# Patient Record
Sex: FEMALE | Race: OTHER | HISPANIC OR LATINO | URBAN - METROPOLITAN AREA
[De-identification: names, ages, dates, MRNs, and addresses within clinical notes are randomized per-mention and may not be internally consistent; named-entity substitution may affect disease eponyms.]

---

## 2020-07-31 ENCOUNTER — EMERGENCY (EMERGENCY)
Facility: HOSPITAL | Age: 40
LOS: 1 days | Discharge: HOME | End: 2020-07-31
Attending: EMERGENCY MEDICINE
Payer: COMMERCIAL

## 2020-07-31 VITALS
HEART RATE: 77 BPM | OXYGEN SATURATION: 99 % | TEMPERATURE: 98 F | DIASTOLIC BLOOD PRESSURE: 76 MMHG | WEIGHT: 192.9 LBS | HEIGHT: 66 IN | SYSTOLIC BLOOD PRESSURE: 125 MMHG | RESPIRATION RATE: 18 BRPM

## 2020-07-31 DIAGNOSIS — Z98.51 TUBAL LIGATION STATUS: Chronic | ICD-10-CM

## 2020-07-31 DIAGNOSIS — Z98.89 OTHER SPECIFIED POSTPROCEDURAL STATES: Chronic | ICD-10-CM

## 2020-07-31 LAB
ALBUMIN SERPL ELPH-MCNC: 4.3 G/DL — SIGNIFICANT CHANGE UP (ref 3.3–5.2)
ALP SERPL-CCNC: 62 U/L — SIGNIFICANT CHANGE UP (ref 40–120)
ALT FLD-CCNC: 12 U/L — SIGNIFICANT CHANGE UP
ANION GAP SERPL CALC-SCNC: 10 MMOL/L — SIGNIFICANT CHANGE UP (ref 5–17)
APPEARANCE UR: CLEAR — SIGNIFICANT CHANGE UP
AST SERPL-CCNC: 15 U/L — SIGNIFICANT CHANGE UP
BASOPHILS # BLD AUTO: 0.03 K/UL — SIGNIFICANT CHANGE UP (ref 0–0.2)
BASOPHILS NFR BLD AUTO: 0.3 % — SIGNIFICANT CHANGE UP (ref 0–2)
BILIRUB SERPL-MCNC: <0.2 MG/DL — LOW (ref 0.4–2)
BILIRUB UR-MCNC: NEGATIVE — SIGNIFICANT CHANGE UP
BLD GP AB SCN SERPL QL: SIGNIFICANT CHANGE UP
BUN SERPL-MCNC: 8 MG/DL — SIGNIFICANT CHANGE UP (ref 8–20)
CALCIUM SERPL-MCNC: 9.3 MG/DL — SIGNIFICANT CHANGE UP (ref 8.6–10.2)
CHLORIDE SERPL-SCNC: 103 MMOL/L — SIGNIFICANT CHANGE UP (ref 98–107)
CO2 SERPL-SCNC: 24 MMOL/L — SIGNIFICANT CHANGE UP (ref 22–29)
COLOR SPEC: YELLOW — SIGNIFICANT CHANGE UP
CREAT SERPL-MCNC: 0.65 MG/DL — SIGNIFICANT CHANGE UP (ref 0.5–1.3)
DIFF PNL FLD: NEGATIVE — SIGNIFICANT CHANGE UP
EOSINOPHIL # BLD AUTO: 0.14 K/UL — SIGNIFICANT CHANGE UP (ref 0–0.5)
EOSINOPHIL NFR BLD AUTO: 1.3 % — SIGNIFICANT CHANGE UP (ref 0–6)
EPI CELLS # UR: SIGNIFICANT CHANGE UP
GLUCOSE SERPL-MCNC: 142 MG/DL — HIGH (ref 70–99)
GLUCOSE UR QL: NEGATIVE MG/DL — SIGNIFICANT CHANGE UP
HCG SERPL-ACNC: 131.7 MIU/ML — HIGH
HCT VFR BLD CALC: 38.9 % — SIGNIFICANT CHANGE UP (ref 34.5–45)
HGB BLD-MCNC: 12.4 G/DL — SIGNIFICANT CHANGE UP (ref 11.5–15.5)
IMM GRANULOCYTES NFR BLD AUTO: 0.4 % — SIGNIFICANT CHANGE UP (ref 0–1.5)
KETONES UR-MCNC: NEGATIVE — SIGNIFICANT CHANGE UP
LEUKOCYTE ESTERASE UR-ACNC: ABNORMAL
LYMPHOCYTES # BLD AUTO: 2.9 K/UL — SIGNIFICANT CHANGE UP (ref 1–3.3)
LYMPHOCYTES # BLD AUTO: 27.7 % — SIGNIFICANT CHANGE UP (ref 13–44)
MCHC RBC-ENTMCNC: 29.7 PG — SIGNIFICANT CHANGE UP (ref 27–34)
MCHC RBC-ENTMCNC: 31.9 GM/DL — LOW (ref 32–36)
MCV RBC AUTO: 93.3 FL — SIGNIFICANT CHANGE UP (ref 80–100)
MONOCYTES # BLD AUTO: 0.54 K/UL — SIGNIFICANT CHANGE UP (ref 0–0.9)
MONOCYTES NFR BLD AUTO: 5.2 % — SIGNIFICANT CHANGE UP (ref 2–14)
NEUTROPHILS # BLD AUTO: 6.81 K/UL — SIGNIFICANT CHANGE UP (ref 1.8–7.4)
NEUTROPHILS NFR BLD AUTO: 65.1 % — SIGNIFICANT CHANGE UP (ref 43–77)
NITRITE UR-MCNC: NEGATIVE — SIGNIFICANT CHANGE UP
PH UR: 6.5 — SIGNIFICANT CHANGE UP (ref 5–8)
PLATELET # BLD AUTO: 303 K/UL — SIGNIFICANT CHANGE UP (ref 150–400)
POTASSIUM SERPL-MCNC: 4.3 MMOL/L — SIGNIFICANT CHANGE UP (ref 3.5–5.3)
POTASSIUM SERPL-SCNC: 4.3 MMOL/L — SIGNIFICANT CHANGE UP (ref 3.5–5.3)
PROT SERPL-MCNC: 7.5 G/DL — SIGNIFICANT CHANGE UP (ref 6.6–8.7)
PROT UR-MCNC: NEGATIVE MG/DL — SIGNIFICANT CHANGE UP
RBC # BLD: 4.17 M/UL — SIGNIFICANT CHANGE UP (ref 3.8–5.2)
RBC # FLD: 12.6 % — SIGNIFICANT CHANGE UP (ref 10.3–14.5)
RBC CASTS # UR COMP ASSIST: NEGATIVE /HPF — SIGNIFICANT CHANGE UP (ref 0–4)
SODIUM SERPL-SCNC: 137 MMOL/L — SIGNIFICANT CHANGE UP (ref 135–145)
SP GR SPEC: 1.01 — SIGNIFICANT CHANGE UP (ref 1.01–1.02)
UROBILINOGEN FLD QL: NEGATIVE MG/DL — SIGNIFICANT CHANGE UP
WBC # BLD: 10.46 K/UL — SIGNIFICANT CHANGE UP (ref 3.8–10.5)
WBC # FLD AUTO: 10.46 K/UL — SIGNIFICANT CHANGE UP (ref 3.8–10.5)
WBC UR QL: SIGNIFICANT CHANGE UP

## 2020-07-31 PROCEDURE — 81001 URINALYSIS AUTO W/SCOPE: CPT

## 2020-07-31 PROCEDURE — 76801 OB US < 14 WKS SINGLE FETUS: CPT | Mod: 26,59

## 2020-07-31 PROCEDURE — 86900 BLOOD TYPING SEROLOGIC ABO: CPT

## 2020-07-31 PROCEDURE — 84702 CHORIONIC GONADOTROPIN TEST: CPT

## 2020-07-31 PROCEDURE — 86901 BLOOD TYPING SEROLOGIC RH(D): CPT

## 2020-07-31 PROCEDURE — 99285 EMERGENCY DEPT VISIT HI MDM: CPT

## 2020-07-31 PROCEDURE — 76817 TRANSVAGINAL US OBSTETRIC: CPT | Mod: 26

## 2020-07-31 PROCEDURE — 86850 RBC ANTIBODY SCREEN: CPT

## 2020-07-31 PROCEDURE — 36415 COLL VENOUS BLD VENIPUNCTURE: CPT

## 2020-07-31 PROCEDURE — 87086 URINE CULTURE/COLONY COUNT: CPT

## 2020-07-31 PROCEDURE — 80053 COMPREHEN METABOLIC PANEL: CPT

## 2020-07-31 PROCEDURE — 76817 TRANSVAGINAL US OBSTETRIC: CPT

## 2020-07-31 PROCEDURE — 85027 COMPLETE CBC AUTOMATED: CPT

## 2020-07-31 PROCEDURE — 99284 EMERGENCY DEPT VISIT MOD MDM: CPT | Mod: 25

## 2020-07-31 PROCEDURE — 76801 OB US < 14 WKS SINGLE FETUS: CPT

## 2020-07-31 NOTE — ED PROVIDER NOTE - FAMILY HISTORY
Mother  Still living? Yes, Estimated age: 61-70  Family history of diabetes mellitus, Age at diagnosis: 51-60     Grandparent  Still living? No  Family history of ovarian cancer, Age at diagnosis: 61-70

## 2020-07-31 NOTE — ED PROVIDER NOTE - PROGRESS NOTE DETAILS
JOSYO- 40 y/o female with hx of tubal ligation presents to the ED c/o adnexal pain for 3 days. + hcg at urgent care, Follows Dr. Zarate, r/o ectopic, [facility]- no intrauterine pregnancy on u/s, with complex free fluid in pelvis, OB consulted [facility]- pt seen and evaluated by GYN team, pt was offerred treatment for likely ectopic pregnancy, but pt is hopefully that it is a intrauterine pregnancy at this time, advised pt the risks of possibility of ectopic pregnancy, and worsening symtpoms to return directly to the ED, pt states she will come back and understands risks that pt may have an ectopic pregnancy, pt needs to follow up in 2 days for hcg blood work to determine if doubling, will attempt to give a script for blood work, give [facility] clinic or advised to return tot he ED for check. pt understands, Pt reassessed, pt feeling better at this time, vss, pt able to walk, talk and vocalized plan of action. Discussed in depth and explained to pt in depth the next steps that need to be taking including proper follow up with PCP or specialists. All incidental findings were discussed with pt as well. Pt verbalized their concerns and all questions were answered. Pt understands dispo and wants discharge. Given good instructions when to return to ED and importance of f/u.

## 2020-07-31 NOTE — CONSULT NOTE ADULT - ASSESSMENT
A 38 yo  with positive beta HCG after tubal ligation to rule out ectopic pregnancy  Ectopic pregnancy is likely however not confirmed by US and clinical picture today  -Script for HCG level on monday in [facility]   -F/U with Dr. Freddie Zarate on Monday for US and HCG read  -ectopic precautions given to return to the ED    Pt fully understanding the severity of the potential ruptured ectopic pregnancy and agrees with the plan   Discussed with Dr. Zarate and Dr. Zhong

## 2020-07-31 NOTE — ED PROVIDER NOTE - PATIENT PORTAL LINK FT
You can access the FollowMyHealth Patient Portal offered by [facility] by registering at the following website: [email or web address]. By joining [facility]’s FollowMyHealth portal, you will also be able to view your health information using other applications (apps) compatible with our system.

## 2020-07-31 NOTE — ED PROVIDER NOTE - NSFOLLOWUPINSTRUCTIONS_ED_ALL_ED_FT
1) Please follow-up with your OB in 3 days.  Please call tomorrow for an appointment.  If you cannot follow-up with your primary care doctor please return to the ED for any urgent issues.  2) You were given a copy of the tests performed today.  Please bring the results with you and review them with your primary care doctor.  3) If you have any worsening of symptoms or any other concerns please return to the ED immediately.  4) Please continue taking your home medications as directed.   5) go to lab for blood work in 2 days, [facility] clinic or return to the ED for HCG check

## 2020-07-31 NOTE — CONSULT NOTE ADULT - SUBJECTIVE AND OBJECTIVE BOX
JEAN CARLOS HOOK  A 39y  with Last Menstrual Period  presented to the ED with positive urine pregnancy test and crampy RUQ pain  Pt underwent tubal ligation 4 and a half years ago. Currently wants to keep the pregnancy if it is viable    PAST MEDICAL & SURGICAL HISTORY:  Asthma  History of bilateral tubal ligation  S/P  section x3      Allergies    latex (Rash)  No Known Drug Allergies    FAMILY HISTORY:  Family history of ovarian cancer (Grandparent)  Family history of diabetes mellitus (Mother)    Social History: Denies ETOH, smoking and drugs.   POB/GYN Hx:    Vital Signs:  Vital Signs Last 24 Hrs  T(C): 36.8 (2020 18:08), Max: 36.8 (2020 18:08)  T(F): 98.2 (2020 18:08), Max: 98.2 (2020 18:08)  HR: 77 (2020 18:08) (77 - 77)  BP: 125/76 (2020 18:08) (125/76 - 125/76)  RR: 18 (2020 18:08) (18 - 18)  SpO2: 99% (2020 18:08) (99% - 99%)    Physical Exam:  General: NAD  CVS: RRR, +S1/S2  Lungs: CTA  Abdomen: +BS, soft, NT.  Ext: No cyanosis, edema or calf tenderness.     Labs:                          12.4   10.46 )-----------( 303      ( 2020 18:55 )             38.9       137  |  103  |  8.0  ----------------------------<  142<H>  4.3   |  24.0  |  0.65    Ca    9.3      2020 18:55    TPro  7.5  /  Alb  4.3  /  TBili  <0.2<L>  /  DBili  x   /  AST  15  /  ALT  12  /  AlkPhos  62        HCG Quantitative, Serum: 131.7 mIU/mL (20 @ 18:55)      Radiology:  < from: US OB <=14 Weeks, First Gestation (20 @ 19:53) >   EXAM:  US OB TRANSVAGINAL                         EXAM:  US OB LES THAN 14 WKS 1ST GEST                          PROCEDURE DATE:  2020          INTERPRETATION:  CLINICAL INFORMATION: RIGHT adnexal pain, history of tubal ligation, positive hCG    LMP: 2020      COMPARISON: None available.    Transabdominal and endovaginal pelvic sonography was performed with Doppler    FINDINGS:  Uterus: 10.1 x 7 x 6 cm. Multiple the Bovie and cysts are noted the largest measuring 7 mm.    Endometrial canal: 14.6 mm. No IUP is visualized in endometrial canal.    Right ovary: 6.1 x 4.3 x 5.6 cm. 4.6 x 3.7 x 4.9 cm complex cyst containing hemorrhagic or proteinaceous debris with septa. Normal vascularity.      Left ovary: 4.1 x 2.9 x 3.7 cm. Within normal limits. Normal vascularity.    Fluid: Mild complex free fluid is noted..    IMPRESSION:    No evidence of intrauterine gestation.  4.6 x 3.7 x 4.9 cm complex cyst containing hemorrhagic or proteinaceous debris with septa. No evidence of torsion. Mild complex free fluid in pelvis. Serial beta hCG levels and ultrasound recommended for further evaluation to establish early pregnancy or rule out ectopic.              GIOVANNI JACOB M.D., ATTENDING RADIOLOGIST  This document has been electronically signed. 2020  8:34PM        < end of copied text >

## 2020-08-02 ENCOUNTER — EMERGENCY (EMERGENCY)
Facility: HOSPITAL | Age: 40
LOS: 1 days | Discharge: HOME | End: 2020-08-02
Attending: EMERGENCY MEDICINE
Payer: COMMERCIAL

## 2020-08-02 VITALS
OXYGEN SATURATION: 99 % | HEIGHT: 66 IN | WEIGHT: 192.9 LBS | HEART RATE: 77 BPM | SYSTOLIC BLOOD PRESSURE: 101 MMHG | TEMPERATURE: 98 F | RESPIRATION RATE: 16 BRPM | DIASTOLIC BLOOD PRESSURE: 69 MMHG

## 2020-08-02 DIAGNOSIS — Z98.51 TUBAL LIGATION STATUS: Chronic | ICD-10-CM

## 2020-08-02 DIAGNOSIS — Z98.89 OTHER SPECIFIED POSTPROCEDURAL STATES: Chronic | ICD-10-CM

## 2020-08-02 PROBLEM — J45.909 UNSPECIFIED ASTHMA, UNCOMPLICATED: Chronic | Status: ACTIVE | Noted: 2020-07-31

## 2020-08-02 LAB
ALBUMIN SERPL ELPH-MCNC: 4.7 G/DL — SIGNIFICANT CHANGE UP (ref 3.3–5.2)
ALP SERPL-CCNC: 68 U/L — SIGNIFICANT CHANGE UP (ref 40–120)
ALT FLD-CCNC: 11 U/L — SIGNIFICANT CHANGE UP
ANION GAP SERPL CALC-SCNC: 11 MMOL/L — SIGNIFICANT CHANGE UP (ref 5–17)
AST SERPL-CCNC: 13 U/L — SIGNIFICANT CHANGE UP
BASOPHILS # BLD AUTO: 0.03 K/UL — SIGNIFICANT CHANGE UP (ref 0–0.2)
BASOPHILS NFR BLD AUTO: 0.3 % — SIGNIFICANT CHANGE UP (ref 0–2)
BILIRUB SERPL-MCNC: 0.6 MG/DL — SIGNIFICANT CHANGE UP (ref 0.4–2)
BLD GP AB SCN SERPL QL: SIGNIFICANT CHANGE UP
BUN SERPL-MCNC: 9 MG/DL — SIGNIFICANT CHANGE UP (ref 8–20)
CALCIUM SERPL-MCNC: 9.1 MG/DL — SIGNIFICANT CHANGE UP (ref 8.6–10.2)
CHLORIDE SERPL-SCNC: 99 MMOL/L — SIGNIFICANT CHANGE UP (ref 98–107)
CO2 SERPL-SCNC: 24 MMOL/L — SIGNIFICANT CHANGE UP (ref 22–29)
CREAT SERPL-MCNC: 0.59 MG/DL — SIGNIFICANT CHANGE UP (ref 0.5–1.3)
CULTURE RESULTS: SIGNIFICANT CHANGE UP
EOSINOPHIL # BLD AUTO: 0.17 K/UL — SIGNIFICANT CHANGE UP (ref 0–0.5)
EOSINOPHIL NFR BLD AUTO: 1.8 % — SIGNIFICANT CHANGE UP (ref 0–6)
GLUCOSE SERPL-MCNC: 108 MG/DL — HIGH (ref 70–99)
HCG SERPL-ACNC: 177.6 MIU/ML — HIGH
HCT VFR BLD CALC: 40.9 % — SIGNIFICANT CHANGE UP (ref 34.5–45)
HGB BLD-MCNC: 13.4 G/DL — SIGNIFICANT CHANGE UP (ref 11.5–15.5)
IMM GRANULOCYTES NFR BLD AUTO: 0.1 % — SIGNIFICANT CHANGE UP (ref 0–1.5)
LYMPHOCYTES # BLD AUTO: 2.21 K/UL — SIGNIFICANT CHANGE UP (ref 1–3.3)
LYMPHOCYTES # BLD AUTO: 23.3 % — SIGNIFICANT CHANGE UP (ref 13–44)
MCHC RBC-ENTMCNC: 30.2 PG — SIGNIFICANT CHANGE UP (ref 27–34)
MCHC RBC-ENTMCNC: 32.8 GM/DL — SIGNIFICANT CHANGE UP (ref 32–36)
MCV RBC AUTO: 92.3 FL — SIGNIFICANT CHANGE UP (ref 80–100)
MONOCYTES # BLD AUTO: 0.58 K/UL — SIGNIFICANT CHANGE UP (ref 0–0.9)
MONOCYTES NFR BLD AUTO: 6.1 % — SIGNIFICANT CHANGE UP (ref 2–14)
NEUTROPHILS # BLD AUTO: 6.5 K/UL — SIGNIFICANT CHANGE UP (ref 1.8–7.4)
NEUTROPHILS NFR BLD AUTO: 68.4 % — SIGNIFICANT CHANGE UP (ref 43–77)
PLATELET # BLD AUTO: 290 K/UL — SIGNIFICANT CHANGE UP (ref 150–400)
POTASSIUM SERPL-MCNC: 4 MMOL/L — SIGNIFICANT CHANGE UP (ref 3.5–5.3)
POTASSIUM SERPL-SCNC: 4 MMOL/L — SIGNIFICANT CHANGE UP (ref 3.5–5.3)
PROT SERPL-MCNC: 7.7 G/DL — SIGNIFICANT CHANGE UP (ref 6.6–8.7)
RBC # BLD: 4.43 M/UL — SIGNIFICANT CHANGE UP (ref 3.8–5.2)
RBC # FLD: 12.7 % — SIGNIFICANT CHANGE UP (ref 10.3–14.5)
SODIUM SERPL-SCNC: 134 MMOL/L — LOW (ref 135–145)
SPECIMEN SOURCE: SIGNIFICANT CHANGE UP
WBC # BLD: 9.5 K/UL — SIGNIFICANT CHANGE UP (ref 3.8–10.5)
WBC # FLD AUTO: 9.5 K/UL — SIGNIFICANT CHANGE UP (ref 3.8–10.5)

## 2020-08-02 PROCEDURE — 99284 EMERGENCY DEPT VISIT MOD MDM: CPT

## 2020-08-02 PROCEDURE — 85027 COMPLETE CBC AUTOMATED: CPT

## 2020-08-02 PROCEDURE — 80053 COMPREHEN METABOLIC PANEL: CPT

## 2020-08-02 PROCEDURE — 86850 RBC ANTIBODY SCREEN: CPT

## 2020-08-02 PROCEDURE — 36415 COLL VENOUS BLD VENIPUNCTURE: CPT

## 2020-08-02 PROCEDURE — 86900 BLOOD TYPING SEROLOGIC ABO: CPT

## 2020-08-02 PROCEDURE — 84702 CHORIONIC GONADOTROPIN TEST: CPT

## 2020-08-02 PROCEDURE — 86901 BLOOD TYPING SEROLOGIC RH(D): CPT

## 2020-08-02 NOTE — ED STATDOCS - ATTENDING CONTRIBUTION TO CARE
I, Patrick Earl, performed the initial face to face bedside interview with this patient regarding history of present illness, review of symptoms and relevant past medical, social and family history.  I completed an independent physical examination.  I was the initial provider who evaluated this patient. I have signed out the follow up of any pending tests (i.e. labs, radiological studies) to the ACP.  I have communicated the patient’s plan of care and disposition with the ACP.

## 2020-08-02 NOTE — ED ADULT TRIAGE NOTE - CHIEF COMPLAINT QUOTE
Pt here for repeat beta. Was last seen here on Friday. Pt denies any vaginal bleeding, has mild cramping. Approx 4 weeks pregnant.

## 2020-08-02 NOTE — ED STATDOCS - CARE PROVIDER_API CALL
Freddie Rea  OBSTETRICS AND GYNECOLOGY  [address] [place] [address]  Phone: [phone]  Fax: [phone]  Follow Up Time:

## 2020-08-02 NOTE — ED STATDOCS - TOBACCO USE
Bedside and Verbal shift change report given to [address] (oncoming nurse) by Mekhi Caro RN (offgoing nurse). Report included the following information SBAR, Kardex, Intake/Output, MAR and Recent Results. Unknown if ever smoked

## 2020-08-02 NOTE — ED STATDOCS - PROGRESS NOTE DETAILS
NP NOTE:  Charting and results reviewed.  Beta went up to 177. I s/w GYN resident, they will come see patient.  Patient having cramping NP NOTE:  GYN evaluated patient.  Offered methotrexate, patient wants to f/u with Dr Jiang before she has any treatment.  Strict return precautions reviewed.

## 2020-08-02 NOTE — ED STATDOCS - NSFOLLOWUPINSTRUCTIONS_ED_ALL_ED_FT
1) IF YOU DEVELOP WORSENING ABDOMINAL OR BACK PAIN OR SUDDEN INCREASE IN PAIN RETURN TO ED  2) SEE DR RYAN WITHIN 24 HOURS FOR FOLLOW UP  3) RETURN TO ED WITH ANY CONCERNING SYMPTOMS

## 2020-08-02 NOTE — ED STATDOCS - CLINICAL SUMMARY MEDICAL DECISION MAKING FREE TEXT BOX
Patient seen 2 days ago with concern for ectopic pregnancy. Has appointment with OBGYN tomorrow for repeat ultrasound, npo pain ort bleeding. Was advised to go to lab for repeat HCG to have results for appointment tomorrow, or come to ED if she could not get to a lab. Patient is here for repeat blood work only.

## 2020-08-02 NOTE — ED STATDOCS - OBJECTIVE STATEMENT
38 y/o female with PMHx of Asthma presents to ED c/o pregnancy problem. Patient is 4-5 weeks pregnant , Patient reports she was here 2 days ago and found to have possible ectopic pregnancy. Reports feeling of menstrual cramps. Was told to come to the ED for a repeat Beta HCG. Has appointment with OBGYN tomorrow.     Denies vaginal bleeding

## 2020-08-02 NOTE — ED STATDOCS - PATIENT PORTAL LINK FT
You can access the FollowMyHealth Patient Portal offered by [facility] by registering at the following website: [email or web address]. By joining [facility]’s FollowMyHealth portal, you will also be able to view your health information using other applications (apps) compatible with our system.

## 2020-08-02 NOTE — CONSULT NOTE ADULT - SUBJECTIVE AND OBJECTIVE BOX
JEAN CARLOS HOOK is a 40 yo  @ ~2w5d by LMP (20) who presents to the ED for repeat b-hCG.    Patient states that on Friday she went to urgent care for evaluation of a cramping RLQ pain     OB HISTORY:     PAST GYN HISTORY: Denies history of abnormal paps, STDs, ovarian cysts, or uterine fibroids.   Menarche at , regular cycles q28-30d, 4-7d bleeding    PAST MEDICAL & SURGICAL HISTORY:  Asthma  History of bilateral tubal ligation  S/P  section      Allergies    latex (Rash)  No Known Drug Allergies    Intolerances        MEDICATIONS  (STANDING):    MEDICATIONS  (PRN):      FAMILY HISTORY:  Family history of ovarian cancer (Grandparent)  Family history of diabetes mellitus (Mother)      Social Hx: denies tobacco use, drug use. Social drinker.     ROS:  CONSTITUTIONAL: No fever, weight loss, or fatigue  RESPIRATORY: No shortness of breath  CARDIOVASCULAR: No chest pain, palpitations, dizziness, or leg swelling  GASTROINTESTINAL: No abdominal pain, nausea, vomiting, diarrhea or constipation.   GENITOURINARY: No dysuria or incontinence   ENDOCRINE: No heat or cold intolerance; No hair loss  PSYCHIATRIC: No depression, anxiety  HEME/LYMPH: No easy bruising, or bleeding gums    PHYSICAL EXAM-  T(C): 36.6 (20 @ 09:58), Max: 36.6 (20 @ 09:58)  HR: 77 (20 @ 09:58) (77 - 77)  BP: 101/69 (20 @ 09:58) (101/69 - 101/69)  RR: 16 (20 @ 09:58) (16 - 16)  SpO2: 99% (20 @ 09:58) (99% - 99%)    CONSTITUTIONAL: well developed no apparent distress, alert, oriented x 3.  PULMONARY: Lungs clear to auscultation   CARDIOVASCULAR: RRR   ABDOMEN: soft, non-tender, +BS, no guarding/rebound/rigidity    PELVIC:        EXTERNAL GENITALIA: atraumatic, no lesions        VAGINA: No discharge or active bleeding         CERVIX: Pink, closed        UTERUS: appropriate size        ADNEXA: not palpable                                                     13.4   9.50  )-----------( 290      ( 02 Aug 2020 11:04 )             40.9     08-    134<L>  |  99  |  9.0  ----------------------------<  108<H>  4.0   |  24.0  |  0.59    Ca    9.1      02 Aug 2020 11:04    TPro  7.7  /  Alb  4.7  /  TBili  0.6  /  DBili  x   /  AST  13  /  ALT  11  /  AlkPhos  68  08-02    SERUM bhcg    177.6  08-02 @ 11:04  SERUM bhcg    131.7   @ 18:55      Radiology JEAN CARLOS HOOK is a 40 yo  @ ~2w5d by LMP (20) who presents to the ED for repeat b-hCG.    Patient states that on Friday she went to urgent care for evaluation of a cramping RLQ pain. At the time, upreg was performed and found to be positive and patient was sent to the ED for evaluation. Patient was seen by the GYN team, at which time physical exam findings were benign; however, her b-hCG was found to be 131.7 with ultrasound findings demonstrating no IUP. Patient was counseled to return to the ED for repeat sono as ectopic pregnancy vs. early IUP could not be ruled out.    On presentation today, patient is overall feeling well. She denies any abdominal pain, vaginal bleeding or purulent vaginal discharge. She denies any nausea, vomiting, fevers, chills, CP, SOB, diarrhea, constipation or abdominal trauma.       OB HISTORY:   - C/s x 3 (, , ) @ Term    PAST GYN HISTORY:   Denies history of abnormal paps, STDs, ovarian cysts, or uterine fibroids.   Regular cycles qmonth  Sexually active with one male partner; denies use of contraception since tubal ligation    PAST MEDICAL HISTORY:  Asthma  HSV (no active lesions; not currently on valtex)    PAST  SURGICAL HISTORY:  History of bilateral tubal ligation  S/P  section      Allergies  latex (Rash)  No Known Drug Allergies        FAMILY HISTORY:  Family history of ovarian cancer (Grandparent)  Family history of diabetes mellitus (Mother)      Social Hx: denies tobacco use, drug use. Social drinker.     ROS:  As per HPI    PHYSICAL EXAM-  T(C): 36.6 (20 @ 09:58), Max: 36.6 (20 @ 09:58)  HR: 77 (20 @ 09:58) (77 - 77)  BP: 101/69 (20 @ 09:58) (101/69 - 101/69)  RR: 16 (20 @ 09:58) (16 - 16)  SpO2: 99% (20 @ 09:58) (99% - 99%)    CONSTITUTIONAL: well developed no apparent distress, alert, oriented x 3.  PULMONARY: Lungs clear to auscultation   CARDIOVASCULAR: RRR   ABDOMEN: soft, non-tender, +BS, no guarding/rebound/rigidity    PELVIC:        EXTERNAL GENITALIA: atraumatic, no lesions        VAGINA: No discharge or active bleeding         CERVIX: Pink, closed        UTERUS: appropriate size        ADNEXA: not palpable                                                     13.4   9.50  )-----------( 290      ( 02 Aug 2020 11:04 )             40.9     08-02    134<L>  |  99  |  9.0  ----------------------------<  108<H>  4.0   |  24.0  |  0.59    Ca    9.1      02 Aug 2020 11:04    TPro  7.7  /  Alb  4.7  /  TBili  0.6  /  DBili  x   /  AST  13  /  ALT  11  /  AlkPhos  68  08-    SERUM bhcg    177.6  - @ 11:04  SERUM bhcg    131.7   @ 18:55      Radiology:  < from: US OB <=14 Weeks, First Gestation (20 @ 19:53) >     EXAM:  US OB TRANSVAGINAL                         EXAM:  US OB LES THAN 14 WKS 1ST GEST                          PROCEDURE DATE:  2020          INTERPRETATION:  CLINICAL INFORMATION: RIGHT adnexal pain, history of tubal ligation, positive hCG    LMP: 2020      COMPARISON: None available.    Transabdominal and endovaginal pelvic sonography was performed with Doppler    FINDINGS:  Uterus: 10.1 x 7 x 6 cm. Multiple the Bovie and cysts are noted the largest measuring 7 mm.    Endometrial canal: 14.6 mm. No IUP is visualized in endometrial canal.    Right ovary: 6.1 x 4.3 x 5.6 cm. 4.6 x 3.7 x 4.9 cm complex cyst containing hemorrhagic or proteinaceous debris with septa. Normal vascularity.      Left ovary: 4.1 x 2.9 x 3.7 cm. Within normal limits. Normal vascularity.    Fluid: Mild complex free fluid is noted..    IMPRESSION:    No evidence of intrauterine gestation.  4.6 x 3.7 x 4.9 cm complex cyst containing hemorrhagic or proteinaceous debris with septa. No evidence of torsion. Mild complex free fluid in pelvis. Serial beta hCG levels and ultrasound recommended for further evaluation to establish early pregnancy or rule out ectopic.              GIOVANNI JACOB M.D., ATTENDING RADIOLOGIST  This document has been electronically signed. 2020  8:34PM                  < end of copied text > JEAN CARLOS HOOK is a 40 yo  @ ~2w5d by LMP (20) who presents to the ED for repeat b-hCG.    Patient states that on Friday she went to urgent care for evaluation of a cramping RLQ pain. At the time, upreg was performed and found to be positive and patient was sent to the ED for evaluation. Patient was seen by the GYN team, at which time physical exam findings were benign; however, her b-hCG was found to be 131.7 with ultrasound findings demonstrating no IUP. Patient was counseled to return to the ED for repeat sono as ectopic pregnancy vs. early IUP could not be ruled out.    On presentation today, patient is overall feeling well. She denies any abdominal pain, vaginal bleeding or purulent vaginal discharge. She denies any nausea, vomiting, fevers, chills, CP, SOB, diarrhea, constipation or abdominal trauma.       OB HISTORY:   - C/s x 3 (, , ) @ Term    PAST GYN HISTORY:   Denies history of abnormal paps, STDs, ovarian cysts, or uterine fibroids.   Regular cycles qmonth  Sexually active with one male partner; denies use of contraception since tubal ligation    PAST MEDICAL HISTORY:  Asthma  HSV (no active lesions; not currently on valtex)    PAST  SURGICAL HISTORY:  History of bilateral tubal ligation  S/P  section      Allergies  latex (Rash)  No Known Drug Allergies        FAMILY HISTORY:  Family history of ovarian cancer (Grandparent)  Family history of diabetes mellitus (Mother)      Social Hx: denies tobacco use, drug use. Social drinker.     ROS:  As per HPI    PHYSICAL EXAM-  T(C): 36.6 (20 @ 09:58), Max: 36.6 (20 @ 09:58)  HR: 77 (20 @ 09:58) (77 - 77)  BP: 101/69 (20 @ 09:58) (101/69 - 101/69)  RR: 16 (20 @ 09:58) (16 - 16)  SpO2: 99% (20 @ 09:58) (99% - 99%)    CONSTITUTIONAL: well developed no apparent distress, alert, oriented x 3.  PULMONARY: Lungs clear to auscultation   CARDIOVASCULAR: RRR   ABDOMEN: soft, non-tender, +BS, no guarding/rebound/rigidity    PELVIC:        EXTERNAL GENITALIA: atraumatic, no lesions        VAGINA: No discharge or active bleeding         CERVIX: Pink, closed, no CMT        UTERUS: appropriate size        ADNEXA: not palpable, non-tender                                                     13.4   9.50  )-----------( 290      ( 02 Aug 2020 11:04 )             40.9     08-02    134<L>  |  99  |  9.0  ----------------------------<  108<H>  4.0   |  24.0  |  0.59    Ca    9.1      02 Aug 2020 11:04    TPro  7.7  /  Alb  4.7  /  TBili  0.6  /  DBili  x   /  AST  13  /  ALT  11  /  AlkPhos  68  08-02    SERUM bhcg    177.6  08- @ 11:04  SERUM bhcg    131.7   @ 18:55      Radiology:  < from: US OB <=14 Weeks, First Gestation (20 @ 19:53) >     EXAM:  US OB TRANSVAGINAL                         EXAM:  US OB LES THAN 14 WKS 1ST GEST                          PROCEDURE DATE:  2020          INTERPRETATION:  CLINICAL INFORMATION: RIGHT adnexal pain, history of tubal ligation, positive hCG    LMP: 2020      COMPARISON: None available.    Transabdominal and endovaginal pelvic sonography was performed with Doppler    FINDINGS:  Uterus: 10.1 x 7 x 6 cm. Multiple the Bovie and cysts are noted the largest measuring 7 mm.    Endometrial canal: 14.6 mm. No IUP is visualized in endometrial canal.    Right ovary: 6.1 x 4.3 x 5.6 cm. 4.6 x 3.7 x 4.9 cm complex cyst containing hemorrhagic or proteinaceous debris with septa. Normal vascularity.      Left ovary: 4.1 x 2.9 x 3.7 cm. Within normal limits. Normal vascularity.    Fluid: Mild complex free fluid is noted..    IMPRESSION:    No evidence of intrauterine gestation.  4.6 x 3.7 x 4.9 cm complex cyst containing hemorrhagic or proteinaceous debris with septa. No evidence of torsion. Mild complex free fluid in pelvis. Serial beta hCG levels and ultrasound recommended for further evaluation to establish early pregnancy or rule out ectopic.              GIOVANNI JACOB M.D., ATTENDING RADIOLOGIST  This document has been electronically signed. 2020  8:34PM                  < end of copied text >

## 2021-03-13 NOTE — ED PROVIDER NOTE - PSH
History of bilateral tubal ligation    S/P  section Implemented All Universal Safety Interventions:  [place] to call system. Call bell, personal items and telephone within reach. Instruct patient to call for assistance. Room bathroom lighting operational. Non-slip footwear when patient is off stretcher. Physically safe environment: no spills, clutter or unnecessary equipment. Stretcher in lowest position, wheels locked, appropriate side rails in place.